# Patient Record
Sex: FEMALE | Race: WHITE | NOT HISPANIC OR LATINO | Employment: FULL TIME | URBAN - METROPOLITAN AREA
[De-identification: names, ages, dates, MRNs, and addresses within clinical notes are randomized per-mention and may not be internally consistent; named-entity substitution may affect disease eponyms.]

---

## 2017-02-10 ENCOUNTER — HISTORICAL (OUTPATIENT)
Dept: LAB | Facility: HOSPITAL | Age: 57
End: 2017-02-10

## 2018-02-22 ENCOUNTER — HISTORICAL (OUTPATIENT)
Dept: LAB | Facility: HOSPITAL | Age: 58
End: 2018-02-22

## 2018-02-22 LAB
BUN SERPL-MCNC: 26 MG/DL (ref 7–18)
CALCIUM SERPL-MCNC: 9 MG/DL (ref 9–10.9)
CHLORIDE SERPL-SCNC: 104 MMOL/L (ref 98–116)
CHOLEST SERPL-MCNC: 197 MG/DL
CO2 SERPL-SCNC: 30 MMOL/L (ref 15–28)
CREAT SERPL-MCNC: 0.65 MG/DL (ref 0.6–1.3)
CRP SERPL HS-MCNC: 1.96 MG/L (ref 0–3)
GLUCOSE SERPL-MCNC: 102 MG/DL (ref 74–106)
HDLC SERPL-MCNC: 50 MG/DL (ref 35–60)
LDLC SERPL CALC-MCNC: 128 MG/DL
POTASSIUM SERPL-SCNC: 3.8 MMOL/L (ref 3.5–5.1)
SODIUM SERPL-SCNC: 143 MEQ/L (ref 131–145)
T3RU NFR SERPL: 33 % (ref 31–39)
T4 SERPL-MCNC: 10.9 MCG/DL (ref 4.7–13.3)
TRIGL SERPL-MCNC: 73 MG/DL (ref 30–150)

## 2019-02-26 ENCOUNTER — HISTORICAL (OUTPATIENT)
Dept: ADMINISTRATIVE | Facility: HOSPITAL | Age: 59
End: 2019-02-26

## 2019-02-26 LAB
APPEARANCE, UA: CLEAR
BACTERIA #/AREA URNS AUTO: ABNORMAL /HPF
BILIRUB UR QL STRIP: NEGATIVE MG/DL
COLOR UR: YELLOW
GLUCOSE (UA): NEGATIVE MG/DL
HGB UR QL STRIP: ABNORMAL UNIT/L
KETONES UR QL STRIP: NEGATIVE MG/DL
LEUKOCYTE ESTERASE UR QL STRIP: ABNORMAL UNIT/L
NITRITE UR QL STRIP.AUTO: NEGATIVE
PH UR STRIP: 7.5 [PH]
PROT UR QL STRIP: NEGATIVE MG/DL
RBC #/AREA URNS HPF: ABNORMAL /HPF
SP GR UR STRIP: 1.01
SQUAMOUS EPITHELIAL, UA: ABNORMAL /LPF
UROBILINOGEN UR STRIP-ACNC: 0.2 MG/DL
WBC #/AREA URNS AUTO: ABNORMAL /[HPF]

## 2021-11-09 ENCOUNTER — HISTORICAL (OUTPATIENT)
Dept: ADMINISTRATIVE | Facility: HOSPITAL | Age: 61
End: 2021-11-09

## 2021-11-09 LAB
ABS NEUT (OLG): 3.1 X10(3)/MCL (ref 2.1–9.2)
ALBUMIN SERPL-MCNC: 4.5 GM/DL (ref 3.4–5)
ALBUMIN/GLOB SERPL: 1.96 {RATIO} (ref 1.5–2.5)
ALP SERPL-CCNC: 60 UNIT/L (ref 38–126)
ALT SERPL-CCNC: 18 UNIT/L (ref 7–52)
AST SERPL-CCNC: 21 UNIT/L (ref 15–37)
BILIRUB SERPL-MCNC: 0.4 MG/DL (ref 0.2–1)
BILIRUBIN DIRECT+TOT PNL SERPL-MCNC: 0.1 MG/DL (ref 0–0.5)
BILIRUBIN DIRECT+TOT PNL SERPL-MCNC: 0.3 MG/DL
BUN SERPL-MCNC: 20 MG/DL (ref 7–18)
CALCIUM SERPL-MCNC: 9.7 MG/DL (ref 8.5–10)
CHLORIDE SERPL-SCNC: 103 MMOL/L (ref 98–107)
CHOLEST SERPL-MCNC: 264 MG/DL (ref 0–200)
CHOLEST/HDLC SERPL: 4.4 {RATIO}
CO2 SERPL-SCNC: 32 MMOL/L (ref 21–32)
CREAT SERPL-MCNC: 0.75 MG/DL (ref 0.6–1.3)
ERYTHROCYTE [DISTWIDTH] IN BLOOD BY AUTOMATED COUNT: 12.6 % (ref 11.5–17)
GLOBULIN SER-MCNC: 2.3 GM/DL (ref 1.2–3)
GLUCOSE SERPL-MCNC: 95 MG/DL (ref 74–106)
HCT VFR BLD AUTO: 41.9 % (ref 37–47)
HDLC SERPL-MCNC: 60 MG/DL (ref 35–60)
HGB BLD-MCNC: 14 GM/DL (ref 12–16)
LDLC SERPL CALC-MCNC: 141 MG/DL (ref 0–129)
LYMPHOCYTES # BLD AUTO: 2.2 X10(3)/MCL (ref 0.6–3.4)
LYMPHOCYTES NFR BLD AUTO: 39.4 % (ref 13–40)
MCH RBC QN AUTO: 30.2 PG (ref 27–31.2)
MCHC RBC AUTO-ENTMCNC: 33 GM/DL (ref 32–36)
MCV RBC AUTO: 90 FL (ref 80–94)
MONOCYTES # BLD AUTO: 0.4 X10(3)/MCL (ref 0.1–1.3)
MONOCYTES NFR BLD AUTO: 7.4 % (ref 0.1–24)
NEUTROPHILS NFR BLD AUTO: 53.2 % (ref 47–80)
PLATELET # BLD AUTO: 297 X10(3)/MCL (ref 130–400)
PMV BLD AUTO: 9.7 FL (ref 9.4–12.4)
POTASSIUM SERPL-SCNC: 4.5 MMOL/L (ref 3.5–5.1)
PROT SERPL-MCNC: 6.8 GM/DL (ref 6.4–8.2)
RBC # BLD AUTO: 4.63 X10(6)/MCL (ref 4.2–5.4)
SODIUM SERPL-SCNC: 144 MMOL/L (ref 136–145)
TRIGL SERPL-MCNC: 125 MG/DL (ref 30–150)
TSH SERPL-ACNC: 1.05 MIU/ML (ref 0.35–4.94)
VLDLC SERPL CALC-MCNC: 25 MG/DL
WBC # SPEC AUTO: 5.7 X10(3)/MCL (ref 4.5–11.5)

## 2022-04-10 ENCOUNTER — HISTORICAL (OUTPATIENT)
Dept: ADMINISTRATIVE | Facility: HOSPITAL | Age: 62
End: 2022-04-10

## 2022-04-25 VITALS
HEIGHT: 59 IN | DIASTOLIC BLOOD PRESSURE: 84 MMHG | SYSTOLIC BLOOD PRESSURE: 138 MMHG | BODY MASS INDEX: 31.74 KG/M2 | WEIGHT: 157.44 LBS

## 2022-05-03 NOTE — HISTORICAL [facility] CERNER
This is a historical note converted from Rossana. Formatting and pictures may have been removed.  Please reference Rossana for original formatting and attached multimedia. Chief Complaint  low back pain and urinary frequency  History of Present Illness  low back pain began a few days ago,?  Also?for about a month also has ?a sensitive area of left lateral back  no consistent dysuria?but has a history of?ureteral reflux and has a concern about urinary tract infection;  no known back injury recently but has been picking up on her grandkids a lot  Also her blood pressure has been dropping a little bit too much at times?with her systolics down around 100?and diastolics around the 60s,?she has occasionally felt fatigued?and a little dizzy when this happens?is not?frequent or consistent yet  Review of Systems  No new complaints except as explained HPI  ?  Physical Exam  Vitals & Measurements  HR:?78(Peripheral)? BP:?128/78?  HT:?150?cm? WT:?74?kg? BMI:?32.89?  mild tender low back bilaterally consistent with mild lumbar strain, negative  tender?area left?upper?far lateral back?almost to the mid axillary line, it is a well defined localized area of tenderness?but otherwise normal exam  Assessment/Plan  1.?Back pain?M54.9  ?Sounds mechanical, recommend chiropractor  Ordered:  Office/Outpatient Visit Level 3 Established [id number] PC, Back pain  Urinary frequency  Hypertension, [facility] - AFP, 02/26/19 7:42:00 CST  Urinalysis Complete no reflex, Routine collect, Urine, 02/26/19 7:43:00 CST, Stop date 02/26/19 7:43:00 CST, Nurse collect, Back pain  Urinary frequency  Hypertension  ?  2.?Urinary frequency?R35.0  ?Will check?UA  Ordered:  Office/Outpatient Visit Level 3 Established [id number] PC, Back pain  Urinary frequency  Hypertension, [facility] - AFP, 02/26/19 7:42:00 CST  Urinalysis Complete no reflex, Routine collect, Urine, 02/26/19 7:43:00 CST, Stop date 02/26/19 7:43:00 CST, Nurse collect, Back pain  Urinary frequency   Hypertension  ?  3.?Hypertension?I10  ?She will continue to monitor her blood pressure?and if the symptoms persist she will try?backing off the medication once in a while?and see if that improves things, she may?need to?break the tablets in half if it becomes very consistent  Ordered:  Office/Outpatient Visit Level 3 Established [id number] PC, Back pain  Urinary frequency  Hypertension, [facility] - AFP, 02/26/19 7:42:00 CST  Urinalysis Complete no reflex, Routine collect, Urine, 02/26/19 7:43:00 CST, Stop date 02/26/19 7:43:00 CST, Nurse collect, Back pain  Urinary frequency  Hypertension  ?  ?shell call if any symptoms persist/worsen/change/etc   Problem List/Past Medical History  Ongoing  Atrophic vaginitis  Benign hematuria  Carotid artery plaque  Cervical dysplasia  Diverticulosis  Fibrocystic breast  Galactorrhea  Gastro-esophageal reflux  Headache  Horseshoe kidney  Hyperlipidemia  Hypertension  Hypoglycemia  Left bundle branch block (LBBB)  Medication management  Mitral valve regurgitation  Obesity  Seasonal allergies  Squamous cell carcinoma  TR (tricuspid regurgitation)  Historical  No qualifying data  Procedure/Surgical History  Bilateral tubal ligation  Caesarean section TIMES 3  Cholecystectomy  D&C - Dilatation and curettage  Endometrial ablation  RIGHT KNEE ATS   Medications  aspirin 325 mg oral tablet, 325 mg= 1 tab(s), Oral, Daily, 4 refills  atorvastatin 40 mg oral tablet, See Instructions, 2 refills  calcium (as calcium citrate) 250 mg oral tablet, 250 mg= 1 tab(s), Oral, BID  Centrum Silver oral tablet, Oral, Daily  cloniDINE 0.1 mg oral tablet, 0.1 mg= 1 tab(s), Oral, Daily, 5 refills  HYDROCHLOROTHIAZIDE 25 MG TAB, 25 mg= 1 tab(s), Oral, Daily  metoprolol tartrate 50 mg tablet  Vitamin D, Oral  Wellbutrin  mg/24 hours oral tablet, extended release, 150 mg= 1 tab(s), Oral, q24hr, 2 refills  Allergies  sulfa drugs  Social History  Alcohol  Current, Wine, 1-2 times per month,  09/15/2016  Employment/School  Employed, 04/12/2018  Home/Environment  Lives with Spouse., 04/12/2018  Substance Abuse  Never, 10/05/2016  Tobacco  Never smoker, N/A, 12/26/2018  Never smoker, 09/15/2016  Family History  Breast cancer: Maternal Grandmother.Negative: Paternal Grandmother.  CAD - Coronary artery disease: Mother and Father.  COPD (chronic obstructive pulmonary disease).: Father.  Hyperlipidemia.: Mother and Father.  Hypertension.: Mother, Father, Sister and Brother.  Migraine: Sister.  Osteoporosis: Mother and Father.  Primary malignant neoplasm of bone: Paternal Grandmother.  Primary malignant neoplasm of female genital organ: Aunt.  Urolithiasis: Sister.  Immunizations  Vaccine Date Status   tetanus/diphth/pertuss (Tdap) adult/adol 03/04/2015 Recorded   tetanus/diphth/pertuss (Tdap) adult/adol 11/13/2008 Recorded   Health Maintenance  Health Maintenance  ???Pending?(in the next year)  ??? ??OverDue  ??? ? ? ?Coronary Artery Disease Maintenance-Antiplatelet Agent Prescribed due??and every?  ??? ? ? ?Coronary Artery Disease Maintenance-Lipid Lowering Therapy due??and every?  ??? ? ? ?Diabetes Screening due??and every?  ??? ? ? ?Cervical Cancer Screening due??11/07/16??and every 3??year(s)  ??? ? ? ?Aspirin Therapy for CVD Prevention due??09/16/17??and every 1??year(s)  ??? ? ? ?Hypertension Management-BMP due??02/22/19??and every 1??year(s)  ??? ??Due?  ??? ? ? ?ADL Screening due??02/26/19??and every 1??year(s)  ??? ? ? ?Alcohol Misuse Screening due??02/26/19??and every 1??year(s)  ??? ? ? ?Hypertension Management-Education due??02/26/19??and every 1??year(s)  ??? ? ? ?Smoking Cessation due??02/26/19??Variable frequency  ??? ??Due In Future?  ??? ? ? ?Breast Cancer Screening not due until??12/15/19??and every 2??year(s)  ???Satisfied?(in the past 1 year)  ??? ??Satisfied?  ??? ? ? ?Blood Pressure Screening on??02/26/19.??Satisfied by Alyssa Potter LPN  ??? ? ? ?Body Mass Index Check  on??02/26/19.??Satisfied by Alyssa Potter LPN  ??? ? ? ?Coronary Artery Disease Maintenance-Lipid Lowering Therapy on??05/28/18.??Satisfied by Austin Garcia MD  ??? ? ? ?Depression Screening on??04/20/18.??Satisfied by Gloria Ayala LPN.  ??? ? ? ?Hypertension Management-Blood Pressure on??02/26/19.??Satisfied by Alyssa Potter LPN  ??? ? ? ?Influenza Vaccine on??12/26/18.??Satisfied by Macy Hong MA  ??? ? ? ?Obesity Screening on??02/26/19.??Satisfied by Alyssa Potter LPN  ?  ?

## 2022-08-02 RX ORDER — LISINOPRIL 10 MG/1
TABLET ORAL
Qty: 90 TABLET | Refills: 0 | Status: SHIPPED | OUTPATIENT
Start: 2022-08-02 | End: 2022-08-03

## 2022-08-23 PROBLEM — R51.9 HEADACHE: Status: ACTIVE | Noted: 2022-08-23

## 2022-08-23 PROBLEM — I10 HYPERTENSION: Status: ACTIVE | Noted: 2022-08-23

## 2022-08-23 PROBLEM — I65.29 CAROTID ATHEROSCLEROSIS: Status: ACTIVE | Noted: 2022-08-23

## 2022-08-23 PROBLEM — N95.2 ATROPHIC VAGINITIS: Status: ACTIVE | Noted: 2022-08-23

## 2022-08-23 PROBLEM — M81.0 OSTEOPOROSIS: Status: ACTIVE | Noted: 2022-08-23

## 2022-08-23 PROBLEM — I44.7 LEFT BUNDLE BRANCH BLOCK (LBBB): Status: ACTIVE | Noted: 2022-08-23

## 2022-08-23 PROBLEM — E78.5 HYPERLIPIDEMIA: Status: ACTIVE | Noted: 2022-08-23

## 2022-08-23 PROBLEM — K21.9 GASTROESOPHAGEAL REFLUX DISEASE: Status: ACTIVE | Noted: 2022-08-23

## 2022-08-23 PROBLEM — K57.90 DIVERTICULAR DISEASE: Status: ACTIVE | Noted: 2022-08-23

## 2022-08-23 PROBLEM — Q63.1 HORSESHOE KIDNEY: Status: ACTIVE | Noted: 2022-08-23

## 2022-08-23 PROBLEM — N02.9 BENIGN HEMATURIA: Status: ACTIVE | Noted: 2022-08-23

## 2022-08-23 PROBLEM — E16.2 HYPOGLYCEMIA: Status: ACTIVE | Noted: 2022-08-23

## 2022-08-23 PROBLEM — J30.2 SEASONAL ALLERGIC RHINITIS: Status: ACTIVE | Noted: 2022-08-23

## 2022-08-23 PROBLEM — I07.1 TRICUSPID VALVE INSUFFICIENCY: Status: ACTIVE | Noted: 2022-08-23

## 2022-08-23 PROBLEM — N60.19 FIBROCYSTIC BREAST CHANGES: Status: ACTIVE | Noted: 2022-08-23

## 2022-08-23 PROBLEM — N87.9 CERVICAL DYSPLASIA: Status: ACTIVE | Noted: 2022-08-23

## 2022-08-25 PROBLEM — Z00.00 ENCOUNTER FOR WELLNESS EXAMINATION: Status: ACTIVE | Noted: 2022-08-25

## 2022-09-19 ENCOUNTER — HISTORICAL (OUTPATIENT)
Dept: ADMINISTRATIVE | Facility: HOSPITAL | Age: 62
End: 2022-09-19

## 2022-11-28 PROBLEM — Z00.00 ENCOUNTER FOR WELLNESS EXAMINATION: Status: RESOLVED | Noted: 2022-08-25 | Resolved: 2022-11-28

## 2023-06-13 DIAGNOSIS — N13.39 OTHER HYDRONEPHROSIS: Primary | ICD-10-CM

## 2023-06-26 ENCOUNTER — HOSPITAL ENCOUNTER (OUTPATIENT)
Dept: RADIOLOGY | Facility: HOSPITAL | Age: 63
Discharge: HOME | End: 2023-06-26
Attending: UROLOGY
Payer: COMMERCIAL

## 2023-06-26 DIAGNOSIS — N13.39 OTHER HYDRONEPHROSIS: ICD-10-CM

## 2023-06-26 PROCEDURE — 78708 K FLOW/FUNCT IMAGE W/DRUG: CPT | Mod: TC

## 2023-06-26 RX ORDER — FUROSEMIDE 10 MG/ML
40 INJECTION INTRAMUSCULAR; INTRAVENOUS ONCE
Status: DISCONTINUED | OUTPATIENT
Start: 2023-06-26 | End: 2023-06-27 | Stop reason: HOSPADM

## 2023-09-07 ENCOUNTER — PATIENT MESSAGE (OUTPATIENT)
Dept: RESEARCH | Facility: HOSPITAL | Age: 63
End: 2023-09-07
Payer: COMMERCIAL

## 2023-09-08 PROBLEM — N32.81 OAB (OVERACTIVE BLADDER): Status: ACTIVE | Noted: 2023-09-08

## 2023-10-23 ENCOUNTER — HOSPITAL ENCOUNTER (EMERGENCY)
Facility: HOSPITAL | Age: 63
Discharge: HOME | End: 2023-10-23
Attending: EMERGENCY MEDICINE
Payer: COMMERCIAL

## 2023-10-23 VITALS
DIASTOLIC BLOOD PRESSURE: 87 MMHG | HEART RATE: 83 BPM | TEMPERATURE: 98 F | BODY MASS INDEX: 31.51 KG/M2 | SYSTOLIC BLOOD PRESSURE: 144 MMHG | RESPIRATION RATE: 18 BRPM | WEIGHT: 156 LBS | OXYGEN SATURATION: 97 %

## 2023-10-23 DIAGNOSIS — R06.02 SOB (SHORTNESS OF BREATH): ICD-10-CM

## 2023-10-23 DIAGNOSIS — R07.9 CHEST PAIN: ICD-10-CM

## 2023-10-23 DIAGNOSIS — R07.9 CHEST PAIN, UNSPECIFIED TYPE: Primary | ICD-10-CM

## 2023-10-23 LAB
ALBUMIN SERPL-MCNC: 4 G/DL (ref 3.4–4.8)
ALBUMIN/GLOB SERPL: 1.2 RATIO (ref 1.1–2)
ALP SERPL-CCNC: 79 UNIT/L (ref 40–150)
ALT SERPL-CCNC: 26 UNIT/L (ref 0–55)
AST SERPL-CCNC: 26 UNIT/L (ref 5–34)
BASOPHILS # BLD AUTO: 0.03 X10(3)/MCL
BASOPHILS NFR BLD AUTO: 0.4 %
BILIRUB SERPL-MCNC: 0.4 MG/DL
BNP BLD-MCNC: 37.5 PG/ML
BUN SERPL-MCNC: 13.3 MG/DL (ref 9.8–20.1)
CALCIUM SERPL-MCNC: 10 MG/DL (ref 8.4–10.2)
CHLORIDE SERPL-SCNC: 102 MMOL/L (ref 98–107)
CO2 SERPL-SCNC: 30 MMOL/L (ref 23–31)
CREAT SERPL-MCNC: 0.78 MG/DL (ref 0.55–1.02)
EOSINOPHIL # BLD AUTO: 0.17 X10(3)/MCL (ref 0–0.9)
EOSINOPHIL NFR BLD AUTO: 2.3 %
ERYTHROCYTE [DISTWIDTH] IN BLOOD BY AUTOMATED COUNT: 12.8 % (ref 11.5–17)
GFR SERPLBLD CREATININE-BSD FMLA CKD-EPI: >60 MLS/MIN/1.73/M2
GLOBULIN SER-MCNC: 3.3 GM/DL (ref 2.4–3.5)
GLUCOSE SERPL-MCNC: 116 MG/DL (ref 82–115)
HCT VFR BLD AUTO: 44.1 % (ref 37–47)
HGB BLD-MCNC: 15.3 G/DL (ref 12–16)
IMM GRANULOCYTES # BLD AUTO: 0.03 X10(3)/MCL (ref 0–0.04)
IMM GRANULOCYTES NFR BLD AUTO: 0.4 %
INR PPP: 0.9
LYMPHOCYTES # BLD AUTO: 2.36 X10(3)/MCL (ref 0.6–4.6)
LYMPHOCYTES NFR BLD AUTO: 31.8 %
MCH RBC QN AUTO: 30.5 PG (ref 27–31)
MCHC RBC AUTO-ENTMCNC: 34.7 G/DL (ref 33–36)
MCV RBC AUTO: 87.8 FL (ref 80–94)
MONOCYTES # BLD AUTO: 0.54 X10(3)/MCL (ref 0.1–1.3)
MONOCYTES NFR BLD AUTO: 7.3 %
NEUTROPHILS # BLD AUTO: 4.3 X10(3)/MCL (ref 2.1–9.2)
NEUTROPHILS NFR BLD AUTO: 57.8 %
NRBC BLD AUTO-RTO: 0 %
PLATELET # BLD AUTO: 290 X10(3)/MCL (ref 130–400)
PMV BLD AUTO: 10.3 FL (ref 7.4–10.4)
POTASSIUM SERPL-SCNC: 3.4 MMOL/L (ref 3.5–5.1)
PROT SERPL-MCNC: 7.3 GM/DL (ref 5.8–7.6)
PROTHROMBIN TIME: 12.2 SECONDS (ref 12.5–14.5)
RBC # BLD AUTO: 5.02 X10(6)/MCL (ref 4.2–5.4)
SODIUM SERPL-SCNC: 143 MMOL/L (ref 136–145)
TROPONIN I SERPL-MCNC: <0.01 NG/ML (ref 0–0.04)
TROPONIN I SERPL-MCNC: <0.01 NG/ML (ref 0–0.04)
WBC # SPEC AUTO: 7.43 X10(3)/MCL (ref 4.5–11.5)

## 2023-10-23 PROCEDURE — 84484 ASSAY OF TROPONIN QUANT: CPT

## 2023-10-23 PROCEDURE — 84484 ASSAY OF TROPONIN QUANT: CPT | Performed by: EMERGENCY MEDICINE

## 2023-10-23 PROCEDURE — 93010 ELECTROCARDIOGRAM REPORT: CPT | Mod: ,,, | Performed by: STUDENT IN AN ORGANIZED HEALTH CARE EDUCATION/TRAINING PROGRAM

## 2023-10-23 PROCEDURE — 83880 ASSAY OF NATRIURETIC PEPTIDE: CPT

## 2023-10-23 PROCEDURE — 85025 COMPLETE CBC W/AUTO DIFF WBC: CPT

## 2023-10-23 PROCEDURE — 93010 EKG 12-LEAD: ICD-10-PCS | Mod: ,,, | Performed by: STUDENT IN AN ORGANIZED HEALTH CARE EDUCATION/TRAINING PROGRAM

## 2023-10-23 PROCEDURE — 85610 PROTHROMBIN TIME: CPT

## 2023-10-23 PROCEDURE — 93005 ELECTROCARDIOGRAM TRACING: CPT

## 2023-10-23 PROCEDURE — 80053 COMPREHEN METABOLIC PANEL: CPT

## 2023-10-23 PROCEDURE — 99285 EMERGENCY DEPT VISIT HI MDM: CPT | Mod: 25

## 2023-10-23 RX ORDER — CLONIDINE HYDROCHLORIDE 0.1 MG/1
0.1 TABLET ORAL
Status: DISCONTINUED | OUTPATIENT
Start: 2023-10-23 | End: 2023-10-23 | Stop reason: HOSPADM

## 2023-10-23 NOTE — FIRST PROVIDER EVALUATION
Medical screening examination initiated.  I have conducted a focused provider triage encounter, findings are as follows:    Brief history of present illness:  63 year old female presents to ER with onset of chest pressure about 15 minutes ago. Denies n/v or SOB. Cardiologist: Dr. Dalton     Vitals:    10/23/23 1253   Pulse: 91   Resp: 18   Temp: 98.1 °F (36.7 °C)   SpO2: 99%   Weight: 70.8 kg (156 lb)       Pertinent physical exam:  Awake and alert, nad    Brief workup plan:  labs, ekg, cxr     Preliminary workup initiated; this workup will be continued and followed by the physician or advanced practice provider that is assigned to the patient when roomed.

## 2023-10-23 NOTE — ED PROVIDER NOTES
Encounter Date: 10/23/2023    SCRIBE #1 NOTE: I, Willian Howe, am scribing for, and in the presence of,  Julian Gan MD. I have scribed the following portions of the note - Other sections scribed: HPI, ROS, PE, EKG, MDM.       History     Chief Complaint   Patient presents with    Chest Pain     Chest pain, described pressure, started 15 mins. Tingling in left hand. Cards hx (bundle branch block) Denies n/v, sob     A 64 y/o female with a history of hypertension and hyperlipidemia presents to [facility] ED with intermittent episodes of chest pressure/tightness radiating to her left arm with tingling lasting a few minutes. Patient has an associated symptom of fatigue. Patient denies nausea, vomiting, diarrhea, cough, congestion, and shortness of breath. Patient denies smoking.     Cardiologist: Jeremiah Dalton MD    The history is provided by the patient and medical records.     Review of patient's allergies indicates:   Allergen Reactions    Sulfa (sulfonamide antibiotics)      Past Medical History:   Diagnosis Date    Benign hematuria     Carotid artery plaque     Cervical dysplasia     Diverticulosis     Fibrocystic breast     Galactorrhea     GERD (gastroesophageal reflux disease)     Headache     HLD (hyperlipidemia)     Horseshoe kidney     HTN (hypertension)     Hypoglycemia, unspecified     Mitral valve regurgitation     Obesity, unspecified     Osteoporosis     Postmenopausal atrophic vaginitis     SCCA (squamous cell carcinoma) of skin     Seasonal allergies     Tricuspid regurgitation      Past Surgical History:   Procedure Laterality Date    BILATERAL TUBAL LIGATION Bilateral      SECTION      CHOLECYSTECTOMY      COLONOSCOPY W/ BIOPSIES AND POLYPECTOMY  2021    DILATION AND CURETTAGE OF UTERUS      ENDOMETRIAL ABLATION      KNEE ARTHROSCOPY       Family History   Problem Relation Age of Onset    Hypertension Mother     Hyperlipidemia Mother     Coronary artery disease Mother      Hypertension Father     Hyperlipidemia Father     Coronary artery disease Father     COPD Father     Hypertension Sister     Migraines Sister     Hypertension Brother      Social History     Tobacco Use    Smoking status: Never    Smokeless tobacco: Never   Substance Use Topics    Alcohol use: Not Currently    Drug use: Never     Review of Systems   Constitutional:  Positive for fatigue. Negative for chills and fever.   HENT:  Negative for congestion and sore throat.    Eyes:  Negative for visual disturbance.   Respiratory:  Negative for cough and shortness of breath.    Cardiovascular:  Positive for chest pain.   Gastrointestinal:  Negative for abdominal pain, diarrhea, nausea and vomiting.   Genitourinary:  Negative for dysuria.   Musculoskeletal:  Negative for myalgias.   Skin:  Negative for rash.   Neurological:  Negative for weakness, numbness and headaches.       Physical Exam     Initial Vitals   BP Pulse Resp Temp SpO2   10/23/23 1254 10/23/23 1253 10/23/23 1253 10/23/23 1253 10/23/23 1253   (!) 189/106 91 18 98.1 °F (36.7 °C) 99 %      MAP       --                Physical Exam    Nursing note and vitals reviewed.  Constitutional: No distress.   HENT:   Head: Normocephalic and atraumatic.   Right Ear: Tympanic membrane normal.   Left Ear: Tympanic membrane normal.   Mouth/Throat: Oropharynx is clear and moist.   Eyes: Conjunctivae and EOM are normal. Pupils are equal, round, and reactive to light.   Neck: Trachea normal. Neck supple. Carotid bruit is not present. No JVD present.   Normal range of motion.  Cardiovascular:  Normal rate and regular rhythm.           Murmur heard.  Systolic murmur is present with a grade of 2/6.  Pulmonary/Chest: Breath sounds normal. No respiratory distress. She exhibits no tenderness.   Abdominal: Abdomen is soft. Bowel sounds are normal. She exhibits no distension. There is no abdominal tenderness.   Musculoskeletal:         General: Normal range of motion.      Cervical  back: Normal range of motion and neck supple.      Lumbar back: Normal. Normal range of motion.     Neurological: She is alert and oriented to person, place, and time. She has normal strength. No cranial nerve deficit or sensory deficit.   Psychiatric: She has a normal mood and affect.         ED Course   Procedures  Labs Reviewed   COMPREHENSIVE METABOLIC PANEL - Abnormal; Notable for the following components:       Result Value    Potassium Level 3.4 (*)     Glucose Level 116 (*)     All other components within normal limits   PROTIME-INR - Abnormal; Notable for the following components:    PT 12.2 (*)     All other components within normal limits   B-TYPE NATRIURETIC PEPTIDE - Normal   TROPONIN I - Normal   TROPONIN I - Normal   CBC W/ AUTO DIFFERENTIAL    Narrative:     The following orders were created for panel order CBC auto differential.  Procedure                               Abnormality         Status                     ---------                               -----------         ------                     CBC with Differential[[id number]]                           Final result                 Please view results for these tests on the individual orders.   CBC WITH DIFFERENTIAL   URINALYSIS, REFLEX TO URINE CULTURE     EKG Readings: (Independently Interpreted)   Rhythm: Normal Sinus Rhythm. Heart Rate: 98. Ectopy: PACs. Conduction: RBBB. ST Segments: Normal ST Segments. T Waves: Normal. Axis: Normal.   Time: 1252. Consistent with prior EKGs.      ECG Results              EKG 12-lead (Final result)  Result time 10/23/23 14:02:15      Final result by Interface, Lab In [facility] (10/23/23 14:02:15)                   Narrative:    Test Reason : R06.02,    Vent. Rate : 098 BPM     Atrial Rate : 098 BPM     P-R Int : 136 ms          QRS Dur : 124 ms      QT Int : 370 ms       P-R-T Axes : 061 001 088 degrees     QTc Int : 472 ms    Sinus rhythm with Premature atrial complexes  Left bundle branch block  Abnormal  ECG  No previous ECGs available  Confirmed by Champ Peterson MD ([id number]) on 10/23/2023 2:02:03 PM    Referred By:             Confirmed By:Champ Peterson MD                                  Imaging Results              X-Ray Chest PA And Lateral (Final result)  Result time 10/23/23 13:09:46      Final result by Dominic Pantoja MD (10/23/23 13:09:46)                   Impression:      No acute cardiopulmonary process identified.      Electronically signed by: Dominic Pantoja  Date:    10/23/2023  Time:    13:09               Narrative:    EXAMINATION:  XR CHEST PA AND LATERAL    CLINICAL HISTORY:  Chest pain, unspecified    TECHNIQUE:  Two-view    COMPARISON:  None available.    FINDINGS:  Cardiopericardial silhouette is within normal limits. Lungs are without dense focal or segmental consolidation, congestion, pleural effusion or pneumothorax.                                       Medications   cloNIDine tablet 0.1 mg (0.1 mg Oral Not Given 10/23/23 1300)     Medical Decision Making  Differential diagnosis includes but is not limited to NSTEMI, STEMI, ACS, noncardiac chest pain, and radiculopathy.     Amount and/or Complexity of Data Reviewed  Labs: ordered.     Details: Normal troponins x2, unremarkable CMP CBC  Radiology: ordered.     Details: Normal chest x-ray  ECG/medicine tests: ordered and independent interpretation performed.  Discussion of management or test interpretation with external provider(s): Discussed with Nicol with cardiology    Risk  Decision regarding hospitalization.            Scribe Attestation:   Scribe #1: I performed the above scribed service and the documentation accurately describes the services I performed. I attest to the accuracy of the note.    Attending Attestation:           Physician Attestation for Scribe:  Physician Attestation Statement for Scribe #1: I, Julian Gan MD, reviewed documentation, as scribed by Willian Howe in my presence, and it is both accurate and  complete.             ED Course as of 10/23/23 1821   Mon Oct 23, 2023   1803 Patient with brief but concerning chest discomfort with left arm symptoms.  She has multiple risk factors, with a baseline abnormal ECG.  She has had unremarkable stress test in the past, including within the past year.  I have discussed the case with Nicol, on-call for Dr. Dalton.  If 2nd troponin is still negative okay with discharge to call the office for close follow-up this week.  The patient absolutely desires discharge, and would prefer that plan versus observation admission.  She will continue taking her aspirin and return if needed. [MP]   1821 Repeat troponin negative, will discharge home [MP]      ED Course User Index  [MP] Julian Gan MD                    Clinical Impression:   Final diagnoses:  [R06.02] SOB (shortness of breath)  [R07.9] Chest pain  [R07.9] Chest pain, unspecified type (Primary)        ED Disposition Condition    Discharge Stable          ED Prescriptions    None       Follow-up Information       Follow up With Specialties Details Why Contact Info    Jeremiah Dalton MD Cardiology In 3 days Call the office tomorrow to schedule follow up this week [address] [facility] [address]  [phone]      [facility] - Emergency Dept Emergency Medicine Go to  If symptoms worsen, As needed [address] [facility] [id number] [phone]             Julian Gan MD  10/23/23 1821

## 2023-10-23 NOTE — DISCHARGE INSTRUCTIONS
Return to the emergency department if worsening pain, further symptoms  Continue taking your full-strength aspirin daily